# Patient Record
Sex: MALE | Race: WHITE | URBAN - METROPOLITAN AREA
[De-identification: names, ages, dates, MRNs, and addresses within clinical notes are randomized per-mention and may not be internally consistent; named-entity substitution may affect disease eponyms.]

---

## 2017-01-20 ENCOUNTER — IMPORTED ENCOUNTER (OUTPATIENT)
Dept: URBAN - METROPOLITAN AREA CLINIC 43 | Facility: CLINIC | Age: 77
End: 2017-01-20

## 2017-01-20 PROBLEM — H16.223: Noted: 2017-01-20

## 2018-07-19 ENCOUNTER — IMPORTED ENCOUNTER (OUTPATIENT)
Dept: URBAN - METROPOLITAN AREA CLINIC 43 | Facility: CLINIC | Age: 78
End: 2018-07-19

## 2018-07-19 PROBLEM — H16.223: Noted: 2018-07-19

## 2018-07-19 PROBLEM — H25.13: Noted: 2018-07-19

## 2019-07-23 ENCOUNTER — IMPORTED ENCOUNTER (OUTPATIENT)
Dept: URBAN - METROPOLITAN AREA CLINIC 43 | Facility: CLINIC | Age: 79
End: 2019-07-23

## 2019-07-23 PROBLEM — H25.13: Noted: 2019-07-23

## 2019-07-23 PROBLEM — H16.223: Noted: 2019-07-23

## 2020-04-19 ASSESSMENT — KERATOMETRY
OS_AXISANGLE_DEGREES: 55
OS_K1POWER_DIOPTERS: 43.25
OS_K2POWER_DIOPTERS: 44
OD_AXISANGLE_DEGREES: 105
OS_K1POWER_DIOPTERS: 44.25
OD_K2POWER_DIOPTERS: 44.25
OD_K1POWER_DIOPTERS: 44.5
OD_AXISANGLE2_DEGREES: 108
OD_K2POWER_DIOPTERS: 43.5
OS_AXISANGLE2_DEGREES: 68
OD_AXISANGLE_DEGREES: 18
OS_K2POWER_DIOPTERS: 43.75
OS_AXISANGLE_DEGREES: 158
OD_AXISANGLE2_DEGREES: 15
OS_AXISANGLE2_DEGREES: 145
OD_K1POWER_DIOPTERS: 43.25

## 2020-04-19 ASSESSMENT — VISUAL ACUITY
OS_CC: J1+
OD_SC: 20/200
OD_OTHER: <20/400.
OS_CC: 20/30 +1
OS_CC: 20/25-1
OS_SC: J1+
OS_OTHER: <20/400.
OD_SC: J1+-1
OD_CC: 20/25 -3
OD_CC: J1
OS_CC: 20/40
OD_SC: 20/100-1
OD_CC: 20/30+2
OS_SC: 20/300
OD_CC: J1+
OS_SC: 20/100-1
OD_CC: 20/25-2
OS_SC: J1+3
OD_CC: 20/25+1
OD_SC: J1+
OS_CC: J3
OS_CC: 20/25+1

## 2020-04-19 ASSESSMENT — TONOMETRY
OS_IOP_MMHG: 13.0
OD_IOP_MMHG: 16.0
OD_IOP_MMHG: 13.0
OS_IOP_MMHG: 13.0
OD_IOP_MMHG: 13.0
OS_IOP_MMHG: 14.0

## 2022-06-06 NOTE — PATIENT DISCUSSION
Approaching visual significance. However, pt indicates not consistently interfering with activities of daily living. Would prefer to try new glasses first before considering surgery.

## 2023-01-26 ENCOUNTER — NEW PATIENT (OUTPATIENT)
Dept: URBAN - METROPOLITAN AREA CLINIC 32 | Facility: CLINIC | Age: 83
End: 2023-01-26

## 2023-01-26 DIAGNOSIS — H25.13: ICD-10-CM

## 2023-01-26 DIAGNOSIS — H16.223: ICD-10-CM

## 2023-01-26 PROCEDURE — 92015 DETERMINE REFRACTIVE STATE: CPT

## 2023-01-26 PROCEDURE — 92004 COMPRE OPH EXAM NEW PT 1/>: CPT

## 2023-01-26 ASSESSMENT — KERATOMETRY
OS_AXISANGLE_DEGREES: 55
OD_AXISANGLE2_DEGREES: 15
OS_AXISANGLE_DEGREES: 159
OD_K2POWER_DIOPTERS: 43.5
OS_AXISANGLE2_DEGREES: 69
OD_AXISANGLE_DEGREES: 105
OD_K1POWER_DIOPTERS: 44.50
OD_K1POWER_DIOPTERS: 44.5
OD_AXISANGLE2_DEGREES: 102
OD_AXISANGLE_DEGREES: 12
OD_K2POWER_DIOPTERS: 43.00
OS_K1POWER_DIOPTERS: 44.25
OS_K2POWER_DIOPTERS: 43.75
OS_AXISANGLE2_DEGREES: 145
OS_K2POWER_DIOPTERS: 43.50

## 2023-01-26 ASSESSMENT — VISUAL ACUITY
OD_CC: 20/50
OD_CC: J5
OD_SC: J1+/-2
OS_SC: J1+/-2
OS_SC: 20/70-1
OS_CC: 20/30-1
OD_SC: 20/400
OS_CC: J2

## 2023-01-26 ASSESSMENT — TONOMETRY
OS_IOP_MMHG: 13
OD_IOP_MMHG: 14

## 2025-06-10 ENCOUNTER — COMPREHENSIVE EXAM (OUTPATIENT)
Age: 85
End: 2025-06-10

## 2025-06-10 DIAGNOSIS — H40.051: ICD-10-CM

## 2025-06-10 DIAGNOSIS — H40.033: ICD-10-CM

## 2025-06-10 DIAGNOSIS — H16.223: ICD-10-CM

## 2025-06-10 DIAGNOSIS — H25.13: ICD-10-CM

## 2025-06-10 PROCEDURE — 92020 GONIOSCOPY: CPT

## 2025-06-10 PROCEDURE — 99214 OFFICE O/P EST MOD 30 MIN: CPT

## 2025-06-10 PROCEDURE — 92015 DETERMINE REFRACTIVE STATE: CPT

## 2025-06-10 PROCEDURE — 92250 FUNDUS PHOTOGRAPHY W/I&R: CPT

## 2025-06-10 RX ORDER — PREDNISOLONE ACETATE 10 MG/ML: 1 SUSPENSION/ DROPS OPHTHALMIC

## 2025-08-04 ENCOUNTER — SURGERY/PROCEDURE (OUTPATIENT)
Age: 85
End: 2025-08-04

## 2025-08-04 DIAGNOSIS — H40.031: ICD-10-CM

## 2025-08-04 PROCEDURE — 66761 REVISION OF IRIS: CPT

## 2025-08-11 ENCOUNTER — SURGERY/PROCEDURE (OUTPATIENT)
Age: 85
End: 2025-08-11

## 2025-08-11 DIAGNOSIS — H40.032: ICD-10-CM

## 2025-08-11 PROCEDURE — 66761 REVISION OF IRIS: CPT | Mod: 79,LT

## 2025-08-26 ENCOUNTER — FOLLOW UP (OUTPATIENT)
Age: 85
End: 2025-08-26

## 2025-08-26 DIAGNOSIS — H25.13: ICD-10-CM

## 2025-08-26 DIAGNOSIS — H16.223: ICD-10-CM

## 2025-08-26 DIAGNOSIS — H40.033: ICD-10-CM

## 2025-08-26 DIAGNOSIS — H40.051: ICD-10-CM

## 2025-08-26 PROCEDURE — 92020 GONIOSCOPY: CPT

## 2025-08-26 PROCEDURE — 99213 OFFICE O/P EST LOW 20 MIN: CPT
